# Patient Record
Sex: MALE | Race: WHITE | NOT HISPANIC OR LATINO
[De-identification: names, ages, dates, MRNs, and addresses within clinical notes are randomized per-mention and may not be internally consistent; named-entity substitution may affect disease eponyms.]

---

## 2023-06-20 ENCOUNTER — P2P PATIENT RECORD (OUTPATIENT)
Age: 63
End: 2023-06-20

## 2023-08-03 ENCOUNTER — OFFICE VISIT (OUTPATIENT)
Dept: URBAN - METROPOLITAN AREA CLINIC 128 | Facility: CLINIC | Age: 63
End: 2023-08-03
Payer: COMMERCIAL

## 2023-08-03 VITALS
HEIGHT: 67 IN | HEART RATE: 71 BPM | SYSTOLIC BLOOD PRESSURE: 114 MMHG | BODY MASS INDEX: 31.36 KG/M2 | TEMPERATURE: 98.1 F | DIASTOLIC BLOOD PRESSURE: 62 MMHG | WEIGHT: 199.8 LBS

## 2023-08-03 DIAGNOSIS — K21.9 GASTROESOPHAGEAL REFLUX DISEASE WITHOUT ESOPHAGITIS: ICD-10-CM

## 2023-08-03 DIAGNOSIS — Z86.010 PERSONAL HISTORY OF COLONIC POLYPS: ICD-10-CM

## 2023-08-03 PROBLEM — 428283002: Status: ACTIVE | Noted: 2023-08-03

## 2023-08-03 PROBLEM — 266435005: Status: ACTIVE | Noted: 2023-08-03

## 2023-08-03 PROCEDURE — 99204 OFFICE O/P NEW MOD 45 MIN: CPT | Performed by: PHYSICIAN ASSISTANT

## 2023-08-03 NOTE — HPI-TODAY'S VISIT:
The patient presents for a colon cancer screening. There is no family history of colon polyps or colon cancer. Patient denies change in bowel habits, appetite, and weight. Patient denies rectal bleeding. Patient denies any cardiac, lung, or kidney problems. Patient denies any digestive symptoms currently. Last colonoscopy: 2015, tubular adenoma and hyperplastic polyp noted, advsised to repeat it in 2 years and is now overdue. He has known GERD refractory to pantoprazole. Last EGD: over 10 years ago.

## 2023-09-21 ENCOUNTER — CLAIMS CREATED FROM THE CLAIM WINDOW (OUTPATIENT)
Dept: URBAN - METROPOLITAN AREA SURGERY CENTER 31 | Facility: SURGERY CENTER | Age: 63
End: 2023-09-21

## 2023-09-21 ENCOUNTER — CLAIMS CREATED FROM THE CLAIM WINDOW (OUTPATIENT)
Dept: URBAN - METROPOLITAN AREA SURGERY CENTER 31 | Facility: SURGERY CENTER | Age: 63
End: 2023-09-21
Payer: COMMERCIAL

## 2023-09-21 DIAGNOSIS — K21.9 GASTRO-ESOPHAGEAL REFLUX DISEASE WITHOUT ESOPHAGITIS: ICD-10-CM

## 2023-09-21 DIAGNOSIS — Z86.010 ADENOMAS PERSONAL HISTORY OF COLONIC POLYPS: ICD-10-CM

## 2023-09-21 DIAGNOSIS — K21.9 GASTRO - ESOPHAGEAL REFLUX DISEASE: ICD-10-CM

## 2023-09-21 DIAGNOSIS — D12.0 ADENOMA OF CECUM: ICD-10-CM

## 2023-09-21 DIAGNOSIS — K63.5 BENIGN COLONIC POLYP: ICD-10-CM

## 2023-09-21 DIAGNOSIS — Z13.810 ENCOUNTER FOR SCREENING FOR UPPER GASTROINTESTINAL DISORDER: ICD-10-CM

## 2023-09-21 DIAGNOSIS — K31.A0 GASTRIC INTESTINAL METAPLASIA: ICD-10-CM

## 2023-09-21 DIAGNOSIS — K44.9 HIATAL HERNIA: ICD-10-CM

## 2023-09-21 PROCEDURE — G8907 PT DOC NO EVENTS ON DISCHARG: HCPCS | Performed by: INTERNAL MEDICINE

## 2023-09-21 PROCEDURE — 45378 DIAGNOSTIC COLONOSCOPY: CPT | Performed by: INTERNAL MEDICINE

## 2023-09-21 PROCEDURE — 00813 ANES UPR LWR GI NDSC PX: CPT | Performed by: NURSE ANESTHETIST, CERTIFIED REGISTERED

## 2023-09-21 PROCEDURE — 43239 EGD BIOPSY SINGLE/MULTIPLE: CPT | Performed by: INTERNAL MEDICINE

## 2023-10-02 ENCOUNTER — OFFICE VISIT (OUTPATIENT)
Dept: URBAN - METROPOLITAN AREA CLINIC 128 | Facility: CLINIC | Age: 63
End: 2023-10-02

## 2023-10-03 ENCOUNTER — TELEPHONE ENCOUNTER (OUTPATIENT)
Dept: URBAN - METROPOLITAN AREA CLINIC 128 | Facility: CLINIC | Age: 63
End: 2023-10-03

## 2023-11-27 ENCOUNTER — OFFICE VISIT (OUTPATIENT)
Dept: URBAN - METROPOLITAN AREA CLINIC 128 | Facility: CLINIC | Age: 63
End: 2023-11-27

## 2024-01-17 ENCOUNTER — OFFICE VISIT (OUTPATIENT)
Dept: URBAN - METROPOLITAN AREA CLINIC 128 | Facility: CLINIC | Age: 64
End: 2024-01-17
Payer: COMMERCIAL

## 2024-01-17 ENCOUNTER — OFFICE VISIT (OUTPATIENT)
Dept: URBAN - METROPOLITAN AREA CLINIC 128 | Facility: CLINIC | Age: 64
End: 2024-01-17

## 2024-01-17 ENCOUNTER — DASHBOARD ENCOUNTERS (OUTPATIENT)
Age: 64
End: 2024-01-17

## 2024-01-17 VITALS
HEART RATE: 63 BPM | DIASTOLIC BLOOD PRESSURE: 82 MMHG | BODY MASS INDEX: 32.43 KG/M2 | TEMPERATURE: 97.3 F | WEIGHT: 206.6 LBS | HEIGHT: 67 IN | SYSTOLIC BLOOD PRESSURE: 140 MMHG

## 2024-01-17 DIAGNOSIS — D12.6 TUBULAR ADENOMA OF COLON: ICD-10-CM

## 2024-01-17 DIAGNOSIS — K22.70 BARRETT'S ESOPHAGUS WITHOUT DYSPLASIA: ICD-10-CM

## 2024-01-17 DIAGNOSIS — K21.9 GASTROESOPHAGEAL REFLUX DISEASE WITHOUT ESOPHAGITIS: ICD-10-CM

## 2024-01-17 DIAGNOSIS — Z86.010 PERSONAL HISTORY OF COLONIC POLYPS: ICD-10-CM

## 2024-01-17 PROBLEM — 302914006: Status: ACTIVE | Noted: 2024-01-17

## 2024-01-17 PROCEDURE — 99213 OFFICE O/P EST LOW 20 MIN: CPT | Performed by: PHYSICIAN ASSISTANT

## 2024-01-17 NOTE — HPI-TODAY'S VISIT:
The patient presents for a follow up. There is no family history of colon polyps or colon cancer. Patient denies change in bowel habits, appetite, and weight. Patient denies rectal bleeding. Patient denies any cardiac, lung, or kidney problems. Patient denies any digestive symptoms currently. Prior colonoscopy: 2015, tubular adenoma and hyperplastic polyp noted, advised to repeat it in 2 years and is now overdue. He has known GERD refractory to pantoprazole. Prior EGD: over 10 years ago. He is a former smoker. 09/2023 EGD was positive for Barretts esophagus and a repeat EGD in 2-3 years advised, 09/2023 colonoscopy revealed a tubular adenoma and he was advised to repeat colonoscopy in 2-3 years. He is on pantoprazole.